# Patient Record
Sex: FEMALE | Race: BLACK OR AFRICAN AMERICAN | Employment: UNEMPLOYED | URBAN - METROPOLITAN AREA
[De-identification: names, ages, dates, MRNs, and addresses within clinical notes are randomized per-mention and may not be internally consistent; named-entity substitution may affect disease eponyms.]

---

## 2017-04-21 ENCOUNTER — OFFICE VISIT (OUTPATIENT)
Dept: CARDIOLOGY CLINIC | Age: 42
End: 2017-04-21

## 2017-04-21 VITALS
OXYGEN SATURATION: 94 % | WEIGHT: 173 LBS | HEIGHT: 60 IN | DIASTOLIC BLOOD PRESSURE: 60 MMHG | HEART RATE: 94 BPM | SYSTOLIC BLOOD PRESSURE: 132 MMHG | BODY MASS INDEX: 33.96 KG/M2

## 2017-04-21 DIAGNOSIS — M32.9 SYSTEMIC LUPUS ERYTHEMATOSUS, UNSPECIFIED SLE TYPE, UNSPECIFIED ORGAN INVOLVEMENT STATUS (HCC): ICD-10-CM

## 2017-04-21 DIAGNOSIS — I25.10 ATHEROSCLEROSIS OF NATIVE CORONARY ARTERY OF NATIVE HEART WITHOUT ANGINA PECTORIS: ICD-10-CM

## 2017-04-21 DIAGNOSIS — I25.5 ISCHEMIC CARDIOMYOPATHY: ICD-10-CM

## 2017-04-21 DIAGNOSIS — R07.9 CHEST PAIN, UNSPECIFIED TYPE: Primary | ICD-10-CM

## 2017-04-21 DIAGNOSIS — N18.6 END STAGE RENAL DISEASE (HCC): ICD-10-CM

## 2017-04-21 RX ORDER — PANTOPRAZOLE SODIUM 40 MG/1
TABLET, DELAYED RELEASE ORAL
Refills: 0 | COMMUNITY
Start: 2017-04-17

## 2017-04-21 RX ORDER — PREDNISONE 5 MG/1
5 TABLET ORAL DAILY
COMMUNITY
Start: 2016-03-22

## 2017-04-21 RX ORDER — CINACALCET 90 MG/1
90 TABLET, FILM COATED ORAL
COMMUNITY
Start: 2016-02-05 | End: 2017-08-03 | Stop reason: SDUPTHER

## 2017-04-21 RX ORDER — HYDROCODONE BITARTRATE AND ACETAMINOPHEN 5; 325 MG/1; MG/1
TABLET ORAL
COMMUNITY
Start: 2017-04-19 | End: 2017-04-22

## 2017-04-21 NOTE — MR AVS SNAPSHOT
Visit Information Date & Time Provider Department Dept. Phone Encounter #  
 4/21/2017  4:00 PM Donis Moore MD [facility] [id number] Your Appointments 6/30/2017  4:00 PM  
Follow Up with Donis Moore MD  
[facility] Appt Note: 6 month f/up w EKG  
 [place] [id number] [address] [address] [phone] [address] Upcoming Health Maintenance Date Due Pneumococcal 19-64 Highest Risk (1 of 3 - PCV13) 3/4/1992 DTaP/Tdap/Td series (1 - Tdap) 3/4/1994 PAP AKA CERVICAL CYTOLOGY 3/4/1994 INFLUENZA AGE 9 TO ADULT 8/1/2016 Allergies as of 4/21/2017  Review Complete On: 4/21/2017 By: Bravo Carvajal Severity Noted Reaction Type Reactions Digoxin  10/15/2012    Hives Diltiazem  04/06/2014    Rash Whole body rash Gentamicin  04/06/2014    Rash Severe. Whole body. Pineapple  04/06/2014    Itching Itchy tongue. Vancomycin  10/15/2012    Hives Other reaction(s): mild rash/itching Severe. Current Immunizations  Reviewed on 9/17/2013 No immunizations on file. Not reviewed this visit You Were Diagnosed With   
  
 Codes Comments Chest pain, unspecified type    -  Primary ICD-10-CM: R07.9 ICD-9-CM: 786.50 Vitals BP Pulse Height(growth percentile) Weight(growth percentile) SpO2 BMI  
 132/60 94 5' (1.524 m) 173 lb (78.5 kg) 94% 33.79 kg/m2 OB Status Smoking Status Postmenopausal Never Smoker Vitals History BMI and BSA Data Body Mass Index Body Surface Area 33.79 kg/m 2 1.82 m 2 Preferred Pharmacy Pharmacy Name Phone [facility] [address] [facility], [address] [phone] Your Updated Medication List  
  
   
 This list is accurate as of: 4/21/17  4:56 PM.  Always use your most recent med list.  
  
  
  
  
 aspirin delayed-release 81 mg tablet Take 81 mg by mouth daily. cinacalcet 90 mg Tab 90 mg. HYDROcodone-acetaminophen 5-325 mg per tablet Commonly known as:  [place] Hurt Take 1 Tab by Mouth Every 4 Hours As Needed for Pain for up to 3 days. pantoprazole 40 mg tablet Commonly known as:  PROTONIX  
take 1 tablet by mouth once daily  
  
 predniSONE 5 mg tablet Commonly known as:  Laqueta Maw Take 5 mg by mouth daily. RENVELA 800 mg Tab tab Generic drug:  sevelamer carbonate Take  by mouth three (3) times daily. We Performed the Following AMB POC EKG ROUTINE W/ 12 LEADS, INTER & REP [36883 CPT(R)] To-Do List   
 04/21/2017 ECHO:  2D ECHO COMPLETE ADULT (TTE) W OR WO CONTR   
  
 05/02/2017 8:00 AM  
  Appointment with [id number] MACHINE (WT ) at [facility] NON-INVASIVE CARD ([phone]) Age Limit for ALL Heart procedures @ all [facility] facilities: 18 yrs and older only. Under the age of 25, refer to [facility] ([phone]). Wt Limit: 350lbs. This study requires patient to bring a written physician's order or MD office may fax the order to Central Scheduling at [phone]. Patient needs to bring a current list of all medications. No preparation is required for this study. Patients should report 15 minutes prior to their appointment time to the [facility], [address] [place] [address]/[address]. Introducing [facility] & HEALTH SERVICES! [facility] introduces [employer] patient portal. Now you can access parts of your medical record, email your doctor's office, and request medication refills online. 1. In your internet browser, go to [email or web address] 2. Click on the First Time User? Click Here link in the Sign In box. You will see the New Member Sign Up page. 3. Enter your "[employer]" Access Code exactly as it appears below. You will not need to use this code after youve completed the sign-up process. If you do not sign up before the expiration date, you must request a new code. · "[employer]" Access Code: [email or web address] Expires: 7/20/2017  4:54 PM 
 
4. Enter the last four digits of your Social Security Number (xxxx) and Date of Birth (mm/dd/yyyy) as indicated and click Submit. You will be taken to the next sign-up page. 5. Create a "[employer]" ID. This will be your "[employer]" login ID and cannot be changed, so think of one that is secure and easy to remember. 6. Create a "[employer]" password. You can change your password at any time. 7. Enter your Password Reset Question and Answer. This can be used at a later time if you forget your password. 8. Enter your e-mail address. You will receive e-mail notification when new information is available in [address] 9. Click Sign Up. You can now view and download portions of your medical record. 10. Click the Download Summary menu link to download a portable copy of your medical information. If you have questions, please visit the Frequently Asked Questions section of the "[employer]" website. Remember, "[employer]" is NOT to be used for urgent needs. For medical emergencies, dial 911. Now available from your iPhone and Android! Please provide this summary of care documentation to your next provider. Your primary care clinician is listed as Vick Sung. If you have any questions after today's visit, please call [phone].

## 2017-04-21 NOTE — LETTER
NOTIFICATION RETURN TO WORK  
 
4/21/2017 4:55 PM 
 
Ms. Evon Guerin [address] [address] To Whom It May Concern: 
 
Evon Guerin is currently under the care of [address] [place] [address]. She will return to work on: Saturday April 29, 2017 with no restriction. If there are questions or concerns please have the patient contact our office. Sincerely, MD Mohsen Nesbitt MD

## 2017-04-21 NOTE — PROGRESS NOTES
HISTORY OF PRESENT ILLNESS  Gil Trivedi is a 38 y.o. female. Chest Pain    Pertinent negatives include no abdominal pain, no claudication, no cough, no dizziness, no fever, no headaches, no nausea, no orthopnea, no palpitations, no PND, no shortness of breath and no vomiting. Patient presents for an add-on office visit. The patient has a past medical history significant for coronary disease status post a remote myocardial infarction in 2005. She also has a history of coronary artery disease and moderate pulmonary hypertension diagnosed on a cardiac catheterization in April 2011. Her pulmonary artery pressure was 51/30. She had a distal 95% LAD lesion just proximal to the apex and severe distal right-sided PDA disease That was felt to be not amenable to revascularization do to the small vessel size and distal location. The cardiac catheterization at that time was done as part of a renal transplant workup. The patient underwent a followup echocardiogram in April 2015 which demonstrated improvement in her overall ejection fraction, EF 40-45% with significant apical hypokinesis. The patient was hospitalized at [facility] in September 2015 for a fever following a procedure to revise her dialysis fistula in her left upper extremity. Apparently an endovascular infection was ruled out. She did undergo a repeat echocardiogram which was not significantly changed compared to the one earlier than near. Ejection fraction in the 40% range with moderate diastolic dysfunction and similar wall motion abnormalities. The patient was last seen in the office 3-4 months ago. She returns today after being seen in the emergency department 2 days ago for right-sided chest pain which was worse when she moved her chest wall or lay down.   Apparently she underwent a bedside ultrasound which demonstrated a small pericardial effusion, and she was felt to possibly have a symptomatic effusion versus pericarditis. She states she was given a dose of morphine and a prescription for oral narcotics in her symptoms have improved. She states that chest pain has been present for about 5 days prior to seeking medical attention. She had an EKG that was unremarkable along with negative cardiac biomarkers. She states that this pain is much different than her prior anginal episodes. Past Medical History:   Diagnosis Date    Anemia     Autoimmune disease ([facility] [address]     lupus    Cardiac cath 04/19/2011    RCA (tortuous) patent. RPDA 99% . LM patent. aLAD 95% affects last 2 cm. CX patent. Diseased arteries are tiny and not amenable to revascularization. RA 12.  RV 52/10. PA 51/30. W 28.  CO 4.4. PVR 2 PENA.  Cardiac echocardiogram 04/14/2015    EF 40-45% (prev 30% on study of 9/2/10). Mild, diffuse hypk. Apical anterior, apical inferior, apical septal, apical lateral, & apical hypk. Mild LAE.  Chronic kidney disease     esrd on MWF    ESRD (end stage renal disease) on dialysis ([facility] [address]     GERD (gastroesophageal reflux disease)     HCVD (hypertensive cardiovascular disease)     History of fibromyalgia     Hypertension     Myocardial infarction [facility]) 2005    [place]. Presenting symptom - crushing substernal chest pain.  Other ill-defined conditions     Lupus, kidney failure, dialysis    Systemic lupus (HCC)     Thrombocytopenia (HCC)       Current Outpatient Prescriptions   Medication Sig Dispense Refill    predniSONE (DELTASONE) 5 mg tablet Take 5 mg by mouth daily.  pantoprazole (PROTONIX) 40 mg tablet take 1 tablet by mouth once daily  0    HYDROcodone-acetaminophen (NORCO) 5-325 mg per tablet Take 1 Tab by Mouth Every 4 Hours As Needed for Pain for up to 3 days.  cinacalcet 90 mg tab 90 mg.      aspirin delayed-release 81 mg tablet Take 81 mg by mouth daily.  sevelamer carbonate (RENVELA) 800 mg Tab tab Take  by mouth three (3) times daily.           Allergies   Allergen Reactions    Digoxin Hives    Diltiazem Rash     Whole body rash    Gentamicin Rash     Severe. Whole body.  Pineapple Itching     Itchy tongue.  Vancomycin Hives     Other reaction(s): mild rash/itching  Severe. Social History   Substance Use Topics    Smoking status: Never Smoker    Smokeless tobacco: Never Used    Alcohol use No           Review of Systems   Constitutional: Negative for chills, fever and weight loss. HENT: Negative for nosebleeds. Eyes: Negative for blurred vision and double vision. Respiratory: Negative for cough, shortness of breath and wheezing. Cardiovascular: Positive for chest pain. Negative for palpitations, orthopnea, claudication, leg swelling and PND. Gastrointestinal: Negative for abdominal pain, heartburn, nausea and vomiting. Genitourinary: Negative for dysuria and hematuria. Musculoskeletal: Negative for falls and myalgias. Skin: Negative for rash. Neurological: Negative for dizziness, focal weakness and headaches. Endo/Heme/Allergies: Does not bruise/bleed easily. Psychiatric/Behavioral: Negative for substance abuse. Visit Vitals    /60    Pulse 94    Ht 5' (1.524 m)    Wt 78.5 kg (173 lb)    SpO2 94%    BMI 33.79 kg/m2      Physical Exam   Constitutional: She is oriented to person, place, and time. She appears well-developed and well-nourished. HENT:   Head: Normocephalic and atraumatic. Eyes: Conjunctivae are normal.   Neck: Neck supple. No JVD present. Carotid bruit is not present. Cardiovascular: Normal rate, regular rhythm, S1 normal, S2 normal and normal pulses. Exam reveals no gallop. No murmur heard. Pulmonary/Chest: Effort normal and breath sounds normal. She has no wheezes. She has no rales. Abdominal: Soft. Bowel sounds are normal. There is no tenderness. Musculoskeletal: She exhibits no edema. Neurological: She is alert and oriented to person, place, and time. Skin: Skin is warm and dry. EKG: Normal sinus rhythm, left atrial abnormality, Low voltage throughout, Poor R wave progression, borderline inferior And lateral Q waves likely from previous infarct, nonspecific ST-T abnormality. Compared to the previous EKG, no significant interval change. ASSESSMENT and PLAN    Chest pain. This sounds somewhat atypical for angina. And may be related to a pericardial effusion/pericarditis, though right-sided pain would be atypical for this as well. I have recommended a full echocardiogram.  If she does have a significant amount of pericardial fluid, I would recommend a stress burst of steroids. Coronary disease. Patient does have a history of a remote myocardial infarction 2005. Her last cardiac catheterization was in April 2011, which showed severe distal LAD disease and severe distal right-sided PDA disease, Felt not to be amenable to be revascularization given the size of her vessels and location of the stenoses. Patient was previously taking both an aspirin and a beta-blocker, however, her beta-blocker was discontinued because of persistently low blood pressure on hemodialysis. I recommended the addition of atorvastatin 20 mg daily if the patient can tolerate. Ischemic cardiomyopathy. Ejection fraction now 40-45% on recent echocardiograms in April and May 2015. Severe hypokinesis of the left ventricular apex. Her volume status is controlled with dialysis. She no longer can take either a beta-blocker or an ACE inhibitor because of low blood pressure while on dialysis. A repeat echocardiogram will be ordered as described above. End stage renal disease. Patient is on dialysis Monday Wednesday Friday. SLE. The patient is on chronic prednisone therapy for this. She may need a increased dose of prednisone if she does have significant pericardial effusion on her echocardiogram.    Followup in 2 months, as previously scheduled.

## 2017-04-21 NOTE — PROGRESS NOTES
1. Have you been to the ER, urgent care clinic since your last visit? Hospitalized since your last visit? [facility] 4/19/17 pericarditis  2. Have you seen or consulted any other health care providers outside of the [address] [place] [address] since your last visit? Include any pap smears or colon screening.  no

## 2017-08-03 ENCOUNTER — OFFICE VISIT (OUTPATIENT)
Dept: CARDIOLOGY CLINIC | Age: 42
End: 2017-08-03

## 2017-08-03 VITALS
HEART RATE: 90 BPM | BODY MASS INDEX: 32.59 KG/M2 | DIASTOLIC BLOOD PRESSURE: 70 MMHG | SYSTOLIC BLOOD PRESSURE: 148 MMHG | HEIGHT: 60 IN | WEIGHT: 166 LBS

## 2017-08-03 DIAGNOSIS — I25.5 ISCHEMIC CARDIOMYOPATHY: ICD-10-CM

## 2017-08-03 DIAGNOSIS — I10 ESSENTIAL HYPERTENSION: ICD-10-CM

## 2017-08-03 DIAGNOSIS — N18.6 END STAGE RENAL DISEASE (HCC): ICD-10-CM

## 2017-08-03 DIAGNOSIS — M32.9 SYSTEMIC LUPUS ERYTHEMATOSUS, UNSPECIFIED SLE TYPE, UNSPECIFIED ORGAN INVOLVEMENT STATUS (HCC): ICD-10-CM

## 2017-08-03 DIAGNOSIS — I25.10 ATHEROSCLEROSIS OF NATIVE CORONARY ARTERY OF NATIVE HEART WITHOUT ANGINA PECTORIS: Primary | ICD-10-CM

## 2017-08-03 RX ORDER — CINACALCET 60 MG/1
TABLET, FILM COATED ORAL
COMMUNITY

## 2017-08-03 NOTE — PROGRESS NOTES
HISTORY OF PRESENT ILLNESS  David Lee is a 38 y.o. female. Hypertension   Pertinent negatives include no chest pain, no abdominal pain, no headaches and no shortness of breath. Chest Pain    Pertinent negatives include no abdominal pain, no claudication, no cough, no dizziness, no fever, no headaches, no nausea, no orthopnea, no palpitations, no PND, no shortness of breath and no vomiting. Patient presents for an add-on office visit. The patient has a past medical history significant for coronary disease status post a remote myocardial infarction in 2005. She also has a history of coronary artery disease and moderate pulmonary hypertension diagnosed on a cardiac catheterization in April 2011. Her pulmonary artery pressure was 51/30. She had a distal 95% LAD lesion just proximal to the apex and severe distal right-sided PDA disease That was felt to be not amenable to revascularization do to the small vessel size and distal location. The cardiac catheterization at that time was done as part of a renal transplant workup. The patient was hospitalized at [facility] in September 2015 for a fever following a procedure to revise her dialysis fistula in her left upper extremity. Apparently an endovascular infection was ruled out. She did undergo a repeat echocardiogram which was not significantly changed compared to the one earlier than near. Ejection fraction in the 40% range with moderate diastolic dysfunction and similar wall motion abnormalities. She had a repeat echocardiogram done in April 2017 which showed an ejection fraction of 35% range with elevated pulmonary pressures of 60-65 mmHg. She recently underwent a revision of her left upper extremity dialysis graft last week and is currently being dialyzed through a right groin temporary dialysis catheter. She was last seen in our office 3 months ago. Since last visit she has been feeling better.   She has had no recurrent chest pain, no shortness of breath, no leg swelling. Her blood pressure remains low on hemodialysis, so she is not in any cardiac medications. Past Medical History:   Diagnosis Date    Anemia     Autoimmune disease ([facility] [address]     lupus    Cardiac cath 04/19/2011    RCA (tortuous) patent. RPDA 99% . LM patent. aLAD 95% affects last 2 cm. CX patent. Diseased arteries are tiny and not amenable to revascularization. RA 12.  RV 52/10. PA 51/30. W 28.  CO 4.4. PVR 2 PENA.  Cardiac echocardiogram 04/2017    EF 35% (prev 40% on study of 2016). Diffuse hypokinesis, mild MR, mild TR, trivial pericardial effusion, RVSP 60-65 mmHg    ESRD (end stage renal disease) on dialysis (HCC)     GERD (gastroesophageal reflux disease)     HCVD (hypertensive cardiovascular disease)     History of fibromyalgia     Hypertension     Myocardial infarction [facility]) 2005    [place]. Presenting symptom - crushing substernal chest pain.  Other ill-defined conditions     Lupus, kidney failure, dialysis    Systemic lupus (HCC)     Thrombocytopenia (HCC)       Current Outpatient Prescriptions   Medication Sig Dispense Refill    cinacalcet (SENSIPAR) 60 mg tab Take  by mouth.  predniSONE (DELTASONE) 5 mg tablet Take 5 mg by mouth daily.  pantoprazole (PROTONIX) 40 mg tablet take 1 tablet by mouth once daily  0    aspirin delayed-release 81 mg tablet Take 81 mg by mouth daily.  sevelamer carbonate (RENVELA) 800 mg Tab tab Take  by mouth three (3) times daily. Allergies   Allergen Reactions    Digoxin Hives    Diltiazem Rash     Whole body rash    Gentamicin Rash     Severe. Whole body.  Pineapple Itching     Itchy tongue.  Vancomycin Hives     Other reaction(s): mild rash/itching  Severe.       Social History   Substance Use Topics    Smoking status: Never Smoker    Smokeless tobacco: Never Used    Alcohol use No           Review of Systems   Constitutional: Negative for chills, fever and weight loss. HENT: Negative for nosebleeds. Eyes: Negative for blurred vision and double vision. Respiratory: Negative for cough, shortness of breath and wheezing. Cardiovascular: Negative for chest pain, palpitations, orthopnea, claudication, leg swelling and PND. Gastrointestinal: Negative for abdominal pain, heartburn, nausea and vomiting. Genitourinary: Negative for dysuria and hematuria. Musculoskeletal: Negative for falls and myalgias. Skin: Negative for rash. Neurological: Negative for dizziness, focal weakness and headaches. Endo/Heme/Allergies: Does not bruise/bleed easily. Psychiatric/Behavioral: Negative for substance abuse. Visit Vitals    /70    Pulse 90    Ht 5' (1.524 m)    Wt 75.3 kg (166 lb)    BMI 32.42 kg/m2      Physical Exam   Constitutional: She is oriented to person, place, and time. She appears well-developed and well-nourished. HENT:   Head: Normocephalic and atraumatic. Eyes: Conjunctivae are normal.   Neck: Neck supple. No JVD present. Carotid bruit is not present. Cardiovascular: Normal rate, regular rhythm, S1 normal, S2 normal and normal pulses. Exam reveals no gallop. No murmur heard. Pulmonary/Chest: Effort normal and breath sounds normal. She has no wheezes. She has no rales. Abdominal: Soft. Bowel sounds are normal. There is no tenderness. Musculoskeletal: She exhibits no edema. Neurological: She is alert and oriented to person, place, and time. Skin: Skin is warm and dry. EKG: Normal sinus rhythm, left atrial abnormality, Low voltage throughout, Poor R wave progression, borderline inferior And lateral Q waves likely from previous infarct, nonspecific ST-T abnormality. Compared to the previous EKG, no significant interval change. ASSESSMENT and PLAN      Coronary disease. Patient does have a history of a remote myocardial infarction 2005.   Her last cardiac catheterization was in April 2011, which showed severe distal LAD disease and severe distal right-sided PDA disease, Felt not to be amenable to be revascularization given the size of her vessels and location of the stenoses. Patient was previously taking both an aspirin and a beta-blocker, however, her beta-blocker was discontinued because of persistently low blood pressure on hemodialysis. I recommended the addition of atorvastatin 20 mg daily if the patient can tolerate. If the patient is able, I would recommend starting carvedilol at 3.125 mg twice daily. Ischemic cardiomyopathy. Ejection fraction now 35% on her most recent echocardiogram from April 2017. Historically her EF has been in the 40-45% range. I do not think that this is a major change. Her volume status continues to be well-controlled on hemodialysis. She has not been able to tolerate either a beta-blocker or an ACE inhibitor due to low blood pressure. If her blood pressure remains reasonably stable, I would consider taking carvedilol 3.125 mg twice daily on nondialysis days. End stage renal disease. Patient is on dialysis Monday Wednesday Friday. SLE. The patient is on chronic prednisone therapy for this. She is now back to taking a low-dose prednisone at 5 mg daily. Followup in 6 months, as previously scheduled.

## 2017-08-03 NOTE — PROGRESS NOTES
1. Have you been to the ER, urgent care clinic since your last visit? Hospitalized since your last visit? No     2. Have you seen or consulted any other health care providers outside of the [address] [place] [address] since your last visit? Include any pap smears or colon screening.  No

## 2017-08-03 NOTE — MR AVS SNAPSHOT
Visit Information Date & Time Provider Department Dept. Phone Encounter #  
 8/3/2017  9:40 AM Miguelito Dior MD [phone] [id number] Upcoming Health Maintenance Date Due Pneumococcal 19-64 Highest Risk (1 of 3 - PCV13) 3/4/1992 DTaP/Tdap/Td series (1 - Tdap) 3/4/1994 PAP AKA CERVICAL CYTOLOGY 3/4/1994 INFLUENZA AGE 9 TO ADULT 8/1/2017 Allergies as of 8/3/2017  Review Complete On: 4/21/2017 By: Miguelito Dior MD  
  
 Severity Noted Reaction Type Reactions Digoxin  10/15/2012    Hives Diltiazem  04/06/2014    Rash Whole body rash Gentamicin  04/06/2014    Rash Severe. Whole body. Pineapple  04/06/2014    Itching Itchy tongue. Vancomycin  10/15/2012    Hives Other reaction(s): mild rash/itching Severe. Current Immunizations  Reviewed on 9/17/2013 No immunizations on file. Not reviewed this visit You Were Diagnosed With   
  
 Codes Comments Atherosclerosis of native coronary artery of native heart without angina pectoris    -  Primary ICD-10-CM: I25.10 ICD-9-CM: 414.01 Ischemic cardiomyopathy     ICD-10-CM: I25.5 ICD-9-CM: 414.8 Essential hypertension     ICD-10-CM: I10 
ICD-9-CM: 401.9 Vitals BP Pulse Height(growth percentile) Weight(growth percentile) BMI OB Status 148/70 90 5' (1.524 m) 166 lb (75.3 kg) 32.42 kg/m2 Postmenopausal  
 Smoking Status Never Smoker Vitals History BMI and BSA Data Body Mass Index Body Surface Area  
 32.42 kg/m 2 1.79 m 2 Preferred Pharmacy Pharmacy Name Phone [facility], [facility] [phone] Your Updated Medication List  
  
   
This list is accurate as of: 8/3/17 11:04 AM.  Always use your most recent med list.  
  
  
  
  
 aspirin delayed-release 81 mg tablet Take 81 mg by mouth daily. pantoprazole 40 mg tablet Commonly known as:  PROTONIX  
take 1 tablet by mouth once daily  
  
 predniSONE 5 mg tablet Commonly known as:  Clayborne Edelson Take 5 mg by mouth daily. RENVELA 800 mg Tab tab Generic drug:  sevelamer carbonate Take  by mouth three (3) times daily. SENSIPAR 60 mg Tab Generic drug:  cinacalcet Take  by mouth. We Performed the Following AMB POC EKG ROUTINE W/ 12 LEADS, INTER & REP [42988 CPT(R)] Introducing [facility]! [facility] introduces [employer] patient portal. Now you can access parts of your medical record, email your doctor's office, and request medication refills online. 1. In your internet browser, go to [email or web address] 2. Click on the First Time User? Click Here link in the Sign In box. You will see the New Member Sign Up page. 3. Enter your [employer] Access Code exactly as it appears below. You will not need to use this code after youve completed the sign-up process. If you do not sign up before the expiration date, you must request a new code. · [employer] Access Code: [email or web address] Expires: 11/1/2017  9:59 AM 
 
4. Enter the last four digits of your Social Security Number (xxxx) and Date of Birth (mm/dd/yyyy) as indicated and click Submit. You will be taken to the next sign-up page. 5. Create a IN[employer]oves ID. This will be your IN[employer]oves login ID and cannot be changed, so think of one that is secure and easy to remember. 6. Create a [employer] password. You can change your password at any time. 7. Enter your Password Reset Question and Answer. This can be used at a later time if you forget your password. 8. Enter your e-mail address. You will receive e-mail notification when new information is available in [address] 9. Click Sign Up. You can now view and download portions of your medical record. 10. Click the Download Summary menu link to download a portable copy of your medical information. If you have questions, please visit the Frequently Asked Questions section of the [employer] website. Remember, [employer] is NOT to be used for urgent needs. For medical emergencies, dial 911. Now available from your iPhone and Android! Please provide this summary of care documentation to your next provider. Your primary care clinician is listed as Rodneynayely Khoury. If you have any questions after today's visit, please call [phone].

## 2018-01-01 ENCOUNTER — HOSPITAL ENCOUNTER (OUTPATIENT)
Age: 43
Discharge: HOME | End: 2018-04-05
Attending: INTERNAL MEDICINE
Payer: MEDICARE

## 2018-01-01 ENCOUNTER — HOSPITAL ENCOUNTER (OUTPATIENT)
Dept: CT IMAGING | Age: 43
Discharge: HOME | End: 2018-04-05
Attending: INTERNAL MEDICINE
Payer: MEDICARE

## 2018-01-01 ENCOUNTER — HOSPITAL ENCOUNTER (OUTPATIENT)
Dept: MAMMOGRAPHY | Age: 43
Discharge: HOME | End: 2018-04-05
Attending: INTERNAL MEDICINE
Payer: MEDICARE

## 2018-01-01 ENCOUNTER — OFFICE VISIT (OUTPATIENT)
Dept: CARDIOLOGY CLINIC | Age: 43
End: 2018-01-01

## 2018-01-01 VITALS
HEIGHT: 60 IN | OXYGEN SATURATION: 95 % | HEART RATE: 95 BPM | DIASTOLIC BLOOD PRESSURE: 72 MMHG | SYSTOLIC BLOOD PRESSURE: 134 MMHG | BODY MASS INDEX: 31.22 KG/M2 | WEIGHT: 159 LBS

## 2018-01-01 DIAGNOSIS — I10 ESSENTIAL HYPERTENSION: ICD-10-CM

## 2018-01-01 DIAGNOSIS — N18.6 END STAGE RENAL DISEASE (HCC): ICD-10-CM

## 2018-01-01 DIAGNOSIS — R29.810 FACIAL WEAKNESS: ICD-10-CM

## 2018-01-01 DIAGNOSIS — R22.9 LOCALIZED SUPERFICIAL SWELLING, MASS, OR LUMP: ICD-10-CM

## 2018-01-01 DIAGNOSIS — Z12.31 VISIT FOR SCREENING MAMMOGRAM: ICD-10-CM

## 2018-01-01 DIAGNOSIS — R63.4 ABNORMAL WEIGHT LOSS: ICD-10-CM

## 2018-01-01 DIAGNOSIS — R19.00 INTRA-ABDOMINAL AND PELVIC SWELLING, MASS AND LUMP, UNSPECIFIED SITE: ICD-10-CM

## 2018-01-01 DIAGNOSIS — I69.820 APHASIA FOLLOWING OTHER CEREBROVASCULAR DISEASE: ICD-10-CM

## 2018-01-01 DIAGNOSIS — I25.10 ATHEROSCLEROSIS OF NATIVE CORONARY ARTERY OF NATIVE HEART WITHOUT ANGINA PECTORIS: Primary | ICD-10-CM

## 2018-01-01 DIAGNOSIS — I25.5 ISCHEMIC CARDIOMYOPATHY: ICD-10-CM

## 2018-01-01 DIAGNOSIS — M32.9 SYSTEMIC LUPUS ERYTHEMATOSUS, UNSPECIFIED SLE TYPE, UNSPECIFIED ORGAN INVOLVEMENT STATUS (HCC): ICD-10-CM

## 2018-01-01 PROCEDURE — 77067 SCR MAMMO BI INCL CAD: CPT

## 2018-01-01 PROCEDURE — 74176 CT ABD & PELVIS W/O CONTRAST: CPT

## 2018-01-01 PROCEDURE — 70490 CT SOFT TISSUE NECK W/O DYE: CPT

## 2018-01-01 PROCEDURE — 70551 MRI BRAIN STEM W/O DYE: CPT

## 2018-01-01 RX ORDER — CARVEDILOL 3.12 MG/1
3.12 TABLET ORAL 2 TIMES DAILY WITH MEALS
Qty: 60 TAB | Refills: 6 | Status: SHIPPED | OUTPATIENT
Start: 2018-01-01

## 2018-04-12 NOTE — PROGRESS NOTES
1. Have you been to the ER, urgent care clinic since your last visit? Hospitalized since your last visit?no    2. Have you seen or consulted any other health care providers outside of the [address] [place] [address] since your last visit? Include any pap smears or colon screening.  no

## 2018-04-12 NOTE — MR AVS SNAPSHOT
[address] [address] [address] [address] [phone] Patient: Claire Jamison MRN: [id number] Visit Information Date & Time Provider Department Dept. Phone Encounter #  
 4/12/2018 11:40 AM Lito Dwyer MD [facility] [phone] Upcoming Health Maintenance Date Due Pneumococcal 19-64 Highest Risk (1 of 3 - PCV13) 3/4/1992 DTaP/Tdap/Td series (1 - Tdap) 3/4/1994 PAP AKA CERVICAL CYTOLOGY 3/4/1994 MEDICARE YEARLY EXAM 3/14/2018 Allergies as of 4/12/2018  Review Complete On: 8/3/2017 By: Lito Dwyer MD  
  
 Severity Noted Reaction Type Reactions Digoxin  10/15/2012    Hives Diltiazem  04/06/2014    Rash Whole body rash Gentamicin  04/06/2014    Rash Severe. Whole body. Pineapple  04/06/2014    Itching Itchy tongue. Vancomycin  10/15/2012    Hives Other reaction(s): mild rash/itching Severe. Current Immunizations  Reviewed on 9/17/2013 No immunizations on file. Not reviewed this visit You Were Diagnosed With   
  
 Codes Comments Atherosclerosis of native coronary artery of native heart without angina pectoris    -  Primary ICD-10-CM: I25.10 ICD-9-CM: 414.01 Ischemic cardiomyopathy     ICD-10-CM: I25.5 ICD-9-CM: 414.8 Essential hypertension     ICD-10-CM: I10 
ICD-9-CM: 401.9 Vitals BP Pulse Height(growth percentile) Weight(growth percentile) SpO2 BMI  
 134/72 95 5' (1.524 m) 159 lb (72.1 kg) 95% 31.05 kg/m2 OB Status Smoking Status Postmenopausal Never Smoker Vitals History BMI and BSA Data Body Mass Index Body Surface Area 31.05 kg/m 2 1.75 m 2 Preferred Pharmacy Pharmacy Name Phone [facility] [address] [facility], 9 [facility] [phone] Your Updated Medication List  
  
   
 This list is accurate as of 4/12/18 12:23 PM.  Always use your most recent med list.  
  
  
  
  
 aspirin delayed-release 81 mg tablet Take 81 mg by mouth daily. carvedilol 3.125 mg tablet Commonly known as:  Saeid Dialer Take 1 Tab by mouth two (2) times daily (with meals). Take evening dose only on dialysis days  
  
 pantoprazole 40 mg tablet Commonly known as:  PROTONIX  
take 1 tablet by mouth once daily  
  
 predniSONE 5 mg tablet Commonly known as:  Ethel Ryder Take 5 mg by mouth daily. RENVELA 800 mg Tab tab Generic drug:  sevelamer carbonate Take  by mouth three (3) times daily. SENSIPAR 60 mg Tab Generic drug:  cinacalcet Take  by mouth. Prescriptions Printed Refills  
 carvedilol (COREG) 3.125 mg tablet 6 Sig: Take 1 Tab by mouth two (2) times daily (with meals). Take evening dose only on dialysis days Class: Print Route: Oral  
  
We Performed the Following AMB POC EKG ROUTINE W/ 12 LEADS, INTER & REP [89777 CPT(R)] To-Do List   
 07/12/2018 ECHO:  2D ECHO COMPLETE ADULT (TTE) W OR WO CONTR Patient Instructions Start Coreg 3.125 mg twice daily. On dialysis days, only take evening dose. Introducing [facility]! P[facility] introduces [employer] patient portal. Now you can access parts of your medical record, email your doctor's office, and request medication refills online. 1. In your internet browser, go to [email or web address] 2. Click on the First Time User? Click Here link in the Sign In box. You will see the New Member Sign Up page. 3. Enter your [employer] Access Code exactly as it appears below. You will not need to use this code after youve completed the sign-up process. If you do not sign up before the expiration date, you must request a new code. · [employer] Access Code: [email or web address] Expires: 6/20/2018  3:39 PM 
 
 4. Enter the last four digits of your Social Security Number (xxxx) and Date of Birth (mm/dd/yyyy) as indicated and click Submit. You will be taken to the next sign-up page. 5. Create a Exp[employer] ID. This will be your [employer] login ID and cannot be changed, so think of one that is secure and easy to remember. 6. Create a [employer] password. You can change your password at any time. 7. Enter your Password Reset Question and Answer. This can be used at a later time if you forget your password. 8. Enter your e-mail address. You will receive e-mail notification when new information is available in [address] 9. Click Sign Up. You can now view and download portions of your medical record. 10. Click the Download Summary menu link to download a portable copy of your medical information. If you have questions, please visit the Frequently Asked Questions section of the [employer] website. Remember, [employer] is NOT to be used for urgent needs. For medical emergencies, dial 911. Now available from your iPhone and Android! Please provide this summary of care documentation to your next provider. Your primary care clinician is listed as Ashley Connolly. If you have any questions after today's visit, please call [phone].

## 2018-04-12 NOTE — PROGRESS NOTES
HISTORY OF PRESENT ILLNESS  Samina Rao is a 37 y.o. female. Hypertension   Pertinent negatives include no chest pain, no abdominal pain, no headaches and no shortness of breath. Patient presents for an add-on office visit. The patient has a past medical history significant for coronary disease status post a remote myocardial infarction in 2005. She also has a history of coronary artery disease and moderate pulmonary hypertension diagnosed on a cardiac catheterization in April 2011. Her pulmonary artery pressure was 51/30. She had a distal 95% LAD lesion just proximal to the apex and severe distal right-sided PDA disease That was felt to be not amenable to revascularization do to the small vessel size and distal location. The cardiac catheterization at that time was done as part of a renal transplant workup. The patient was hospitalized at [facility] in September 2015 for a fever following a procedure to revise her dialysis fistula in her left upper extremity. Apparently an endovascular infection was ruled out. She did undergo a repeat echocardiogram which was not significantly changed compared to the one earlier than near. Ejection fraction in the 40% range with moderate diastolic dysfunction and similar wall motion abnormalities. She had a repeat echocardiogram done in April 2017 which showed an ejection fraction of 35% range with elevated pulmonary pressures of 60-65 mmHg. The patient was last seen by me in the office approximately 8 months ago. Since last visit, she was evaluated by [facility] as part of a renal transplant workup. She was told she was likely not a candidate based on her known coronary artery disease. She returns to the office today doing well. She denies any new shortness of breath at rest or with exertion. No chest pain or palpitations.   She does get some leg swelling at the end of the day but this usually improves at night or after dialysis. No major change in her activity tolerance. Past Medical History:   Diagnosis Date    Anemia     Autoimmune disease ([facility] [address]     lupus    Cardiac cath 04/19/2011    RCA (tortuous) patent. RPDA 99% . LM patent. aLAD 95% affects last 2 cm. CX patent. Diseased arteries are tiny and not amenable to revascularization. RA 12.  RV 52/10. PA 51/30. W 28.  CO 4.4. PVR 2 PENA.  Cardiac echocardiogram 04/2017    EF 35% (prev 40% on study of 2016). Diffuse hypokinesis, mild MR, mild TR, trivial pericardial effusion, RVSP 60-65 mmHg    ESRD (end stage renal disease) on dialysis (HCC)     GERD (gastroesophageal reflux disease)     HCVD (hypertensive cardiovascular disease)     History of fibromyalgia     Hypertension     Myocardial infarction [facility]) 2005    [place]. Presenting symptom - crushing substernal chest pain.  Other ill-defined conditions(799.89)     Lupus, kidney failure, dialysis    Systemic lupus (HCC)     Thrombocytopenia (HCC)       Current Outpatient Prescriptions   Medication Sig Dispense Refill    carvedilol (COREG) 3.125 mg tablet Take 1 Tab by mouth two (2) times daily (with meals). Take evening dose only on dialysis days 60 Tab 6    cinacalcet (SENSIPAR) 60 mg tab Take  by mouth.  predniSONE (DELTASONE) 5 mg tablet Take 5 mg by mouth daily.  pantoprazole (PROTONIX) 40 mg tablet take 1 tablet by mouth once daily  0    aspirin delayed-release 81 mg tablet Take 81 mg by mouth daily.  sevelamer carbonate (RENVELA) 800 mg Tab tab Take  by mouth three (3) times daily. Allergies   Allergen Reactions    Digoxin Hives    Diltiazem Rash     Whole body rash    Gentamicin Rash     Severe. Whole body.  Pineapple Itching     Itchy tongue.  Vancomycin Hives     Other reaction(s): mild rash/itching  Severe.       Social History   Substance Use Topics    Smoking status: Never Smoker    Smokeless tobacco: Never Used    Alcohol use No Review of Systems   Constitutional: Negative for chills, fever and weight loss. HENT: Negative for nosebleeds. Eyes: Negative for blurred vision and double vision. Respiratory: Negative for cough, shortness of breath and wheezing. Cardiovascular: Positive for leg swelling. Negative for chest pain, palpitations, orthopnea, claudication and PND. Gastrointestinal: Negative for abdominal pain, heartburn, nausea and vomiting. Genitourinary: Negative for dysuria and hematuria. Musculoskeletal: Negative for falls and myalgias. Skin: Negative for rash. Neurological: Negative for dizziness, focal weakness and headaches. Endo/Heme/Allergies: Does not bruise/bleed easily. Psychiatric/Behavioral: Negative for substance abuse. Visit Vitals    /72    Pulse 95    Ht 5' (1.524 m)    Wt 72.1 kg (159 lb)    SpO2 95%    BMI 31.05 kg/m2      Physical Exam   Constitutional: She is oriented to person, place, and time. She appears well-developed and well-nourished. HENT:   Head: Normocephalic and atraumatic. Eyes: Conjunctivae are normal.   Neck: Neck supple. No JVD present. Carotid bruit is not present. Cardiovascular: Normal rate, regular rhythm, S1 normal, S2 normal and normal pulses. Exam reveals no gallop. No murmur heard. Pulmonary/Chest: Effort normal and breath sounds normal. She has no wheezes. She has no rales. Abdominal: Soft. Bowel sounds are normal. There is no tenderness. Musculoskeletal: She exhibits no edema. Neurological: She is alert and oriented to person, place, and time. Skin: Skin is warm and dry. EKG: Normal sinus rhythm, left atrial abnormality, Low voltage throughout, Poor R wave progression, borderline inferior And lateral Q waves likely from previous infarct, nonspecific ST-T abnormality. Compared to the previous EKG, no significant interval change. ASSESSMENT and PLAN    Coronary disease.   Patient does have a history of a remote myocardial infarction 2005. Her last cardiac catheterization was in April 2011, which showed severe distal LAD disease and severe distal right-sided PDA disease, Felt not to be amenable to be revascularization given the size of her vessels and location of the stenoses. Patient was previously taking both an aspirin and a beta-blocker, however, her beta-blocker was discontinued because of persistently low blood pressure on hemodialysis. I recommended starting carvedilol  3.125 mg twice daily. She can hold this medication in the morning before dialysis. Ischemic cardiomyopathy. Ejection fraction now 35% on her most recent echocardiogram from April 2017. Historically her EF has been in the 40-45% range. I do not think that this is a major change. Her volume status continues to be well-controlled on hemodialysis. I recommended starting a low-dose carvedilol to see if her blood pressure will tolerate this. She can hold it on the morning of dialysis days. If she does tolerate this medication, maybe we can try a low-dose ACE inhibitor or ARB next. I would like to repeat an echocardiogram in several months prior to next visit. End stage renal disease. Patient is on dialysis Monday Wednesday Friday. SLE. The patient is on chronic prednisone therapy for this. She is now back to taking a low-dose prednisone at 5 mg daily. Followup in 6 weeks with our nurse practitioner or PA to see if her medications can be adjusted. I would like to see her back in 3 months, sooner if needed.